# Patient Record
Sex: MALE | Race: ASIAN | NOT HISPANIC OR LATINO | URBAN - METROPOLITAN AREA
[De-identification: names, ages, dates, MRNs, and addresses within clinical notes are randomized per-mention and may not be internally consistent; named-entity substitution may affect disease eponyms.]

---

## 2017-04-07 ENCOUNTER — CHARTING TRANS (OUTPATIENT)
Dept: URGENT CARE | Age: 22
End: 2017-04-07

## 2017-06-29 ENCOUNTER — OFFICE VISIT (OUTPATIENT)
Dept: INTERNAL MEDICINE CLINIC | Facility: CLINIC | Age: 22
End: 2017-06-29

## 2017-06-29 ENCOUNTER — LAB ENCOUNTER (OUTPATIENT)
Dept: LAB | Age: 22
End: 2017-06-29
Attending: STUDENT IN AN ORGANIZED HEALTH CARE EDUCATION/TRAINING PROGRAM
Payer: COMMERCIAL

## 2017-06-29 VITALS
TEMPERATURE: 98 F | HEIGHT: 68 IN | SYSTOLIC BLOOD PRESSURE: 128 MMHG | OXYGEN SATURATION: 98 % | HEART RATE: 98 BPM | BODY MASS INDEX: 47.74 KG/M2 | DIASTOLIC BLOOD PRESSURE: 86 MMHG | WEIGHT: 315 LBS

## 2017-06-29 DIAGNOSIS — Z13.0 SCREENING FOR IRON DEFICIENCY ANEMIA: ICD-10-CM

## 2017-06-29 DIAGNOSIS — Z13.1 SCREENING FOR DIABETES MELLITUS: ICD-10-CM

## 2017-06-29 DIAGNOSIS — Z13.228 SCREENING FOR METABOLIC DISORDER: ICD-10-CM

## 2017-06-29 DIAGNOSIS — E55.9 VITAMIN D DEFICIENCY: ICD-10-CM

## 2017-06-29 DIAGNOSIS — Z13.89 SCREENING FOR GENITOURINARY CONDITION: ICD-10-CM

## 2017-06-29 DIAGNOSIS — Z13.220 SCREENING FOR LIPOID DISORDERS: ICD-10-CM

## 2017-06-29 DIAGNOSIS — R29.818 SUSPECTED SLEEP APNEA: ICD-10-CM

## 2017-06-29 DIAGNOSIS — Z13.29 SCREENING FOR THYROID DISORDER: ICD-10-CM

## 2017-06-29 DIAGNOSIS — Z00.00 ENCOUNTER FOR ANNUAL PHYSICAL EXAM: Primary | ICD-10-CM

## 2017-06-29 PROBLEM — E66.01 MORBID OBESITY WITH BMI OF 50.0-59.9, ADULT (HCC): Status: ACTIVE | Noted: 2017-06-29

## 2017-06-29 LAB
25-HYDROXYVITAMIN D (TOTAL): 18.3 NG/ML (ref 30–100)
ALBUMIN SERPL-MCNC: 3.6 G/DL (ref 3.5–4.8)
ALP LIVER SERPL-CCNC: 79 U/L (ref 45–117)
ALT SERPL-CCNC: 141 U/L (ref 17–63)
AST SERPL-CCNC: 61 U/L (ref 15–41)
BASOPHILS # BLD AUTO: 0.04 X10(3) UL (ref 0–0.1)
BASOPHILS NFR BLD AUTO: 0.4 %
BILIRUB SERPL-MCNC: 0.8 MG/DL (ref 0.1–2)
BILIRUB UR QL STRIP.AUTO: NEGATIVE
BUN BLD-MCNC: 12 MG/DL (ref 8–20)
CALCIUM BLD-MCNC: 9.2 MG/DL (ref 8.3–10.3)
CHLORIDE: 105 MMOL/L (ref 101–111)
CHOLEST SMN-MCNC: 164 MG/DL (ref ?–190)
CLARITY UR REFRACT.AUTO: CLEAR
CO2: 23 MMOL/L (ref 22–32)
COLOR UR AUTO: YELLOW
CREAT BLD-MCNC: 1.06 MG/DL (ref 0.7–1.3)
EOSINOPHIL # BLD AUTO: 0.25 X10(3) UL (ref 0–0.3)
EOSINOPHIL NFR BLD AUTO: 2.7 %
ERYTHROCYTE [DISTWIDTH] IN BLOOD BY AUTOMATED COUNT: 11.9 % (ref 11.5–16)
EST. AVERAGE GLUCOSE BLD GHB EST-MCNC: 123 MG/DL (ref 68–126)
GLUCOSE BLD-MCNC: 97 MG/DL (ref 70–99)
GLUCOSE UR STRIP.AUTO-MCNC: NEGATIVE MG/DL
HBA1C MFR BLD HPLC: 5.9 % (ref ?–5.7)
HCT VFR BLD AUTO: 44.7 % (ref 37–53)
HDLC SERPL-MCNC: 40 MG/DL (ref 45–?)
HDLC SERPL: 4.1 {RATIO} (ref ?–4.97)
HGB BLD-MCNC: 15.3 G/DL (ref 13–17)
IMMATURE GRANULOCYTE COUNT: 0.02 X10(3) UL (ref 0–1)
IMMATURE GRANULOCYTE RATIO %: 0.2 %
KETONES UR STRIP.AUTO-MCNC: NEGATIVE MG/DL
LDLC SERPL CALC-MCNC: 98 MG/DL (ref ?–120)
LEUKOCYTE ESTERASE UR QL STRIP.AUTO: NEGATIVE
LYMPHOCYTES # BLD AUTO: 2.28 X10(3) UL (ref 0.9–4)
LYMPHOCYTES NFR BLD AUTO: 24.5 %
M PROTEIN MFR SERPL ELPH: 8.2 G/DL (ref 6.1–8.3)
MCH RBC QN AUTO: 28.4 PG (ref 27–33.2)
MCHC RBC AUTO-ENTMCNC: 34.2 G/DL (ref 31–37)
MCV RBC AUTO: 83.1 FL (ref 80–99)
MONOCYTES # BLD AUTO: 0.63 X10(3) UL (ref 0.1–0.6)
MONOCYTES NFR BLD AUTO: 6.8 %
NEUTROPHIL ABS PRELIM: 6.1 X10 (3) UL (ref 1.3–6.7)
NEUTROPHILS # BLD AUTO: 6.1 X10(3) UL (ref 1.3–6.7)
NEUTROPHILS NFR BLD AUTO: 65.4 %
NITRITE UR QL STRIP.AUTO: NEGATIVE
NONHDLC SERPL-MCNC: 124 MG/DL (ref ?–150)
PH UR STRIP.AUTO: 6 [PH] (ref 4.5–8)
PLATELET # BLD AUTO: 238 10(3)UL (ref 150–450)
POTASSIUM SERPL-SCNC: 4 MMOL/L (ref 3.6–5.1)
PROT UR STRIP.AUTO-MCNC: NEGATIVE MG/DL
RBC # BLD AUTO: 5.38 X10(6)UL (ref 4.3–5.7)
RBC UR QL AUTO: NEGATIVE
RED CELL DISTRIBUTION WIDTH-SD: 35.7 FL (ref 35.1–46.3)
SODIUM SERPL-SCNC: 139 MMOL/L (ref 136–144)
SP GR UR STRIP.AUTO: 1.02 (ref 1–1.03)
TRIGLYCERIDES: 131 MG/DL (ref ?–115)
TSI SER-ACNC: 3.75 MIU/ML (ref 0.35–5.5)
UROBILINOGEN UR STRIP.AUTO-MCNC: <2 MG/DL
VLDL: 26 MG/DL (ref 5–40)
WBC # BLD AUTO: 9.3 X10(3) UL (ref 4–13)

## 2017-06-29 PROCEDURE — 80050 GENERAL HEALTH PANEL: CPT | Performed by: STUDENT IN AN ORGANIZED HEALTH CARE EDUCATION/TRAINING PROGRAM

## 2017-06-29 PROCEDURE — 80061 LIPID PANEL: CPT | Performed by: STUDENT IN AN ORGANIZED HEALTH CARE EDUCATION/TRAINING PROGRAM

## 2017-06-29 PROCEDURE — 99395 PREV VISIT EST AGE 18-39: CPT | Performed by: STUDENT IN AN ORGANIZED HEALTH CARE EDUCATION/TRAINING PROGRAM

## 2017-06-29 PROCEDURE — 81003 URINALYSIS AUTO W/O SCOPE: CPT | Performed by: STUDENT IN AN ORGANIZED HEALTH CARE EDUCATION/TRAINING PROGRAM

## 2017-06-29 PROCEDURE — 36415 COLL VENOUS BLD VENIPUNCTURE: CPT | Performed by: STUDENT IN AN ORGANIZED HEALTH CARE EDUCATION/TRAINING PROGRAM

## 2017-06-29 PROCEDURE — 82306 VITAMIN D 25 HYDROXY: CPT | Performed by: STUDENT IN AN ORGANIZED HEALTH CARE EDUCATION/TRAINING PROGRAM

## 2017-06-29 PROCEDURE — 83036 HEMOGLOBIN GLYCOSYLATED A1C: CPT | Performed by: STUDENT IN AN ORGANIZED HEALTH CARE EDUCATION/TRAINING PROGRAM

## 2017-06-29 NOTE — PROGRESS NOTES
[facility]    REASON FOR VISIT:    Joselin Hayes is a 25year old male who presents for an [address]. Current Complaints: Reports excessive daytime sleepiness and tiredness.  His father reports that patient snores a lot during his sl (mg/dL)   Date Value   07/30/2013 80         Gonorrhea Screening If high risk No results found for: GONOCOCCUS    HIV Screening For all adults age 22-65, older adults at increased risk No results found for: HIV    Syphilis Screening Screen if pregnant or h Does not bruise/bleed easily. Psychiatric/Behavioral: Negative for confusion and agitation. The patient is not nervous/anxious.       EXAM:   /86   Pulse 98   Temp 98.1 °F (36.7 °C) (Oral)   Ht 68\"   Wt (!) 335 lb   SpO2 98%   BMI 50.94 kg/m²     W for genitourinary condition  Vitamin D deficiency      Age appropriate cancer screening, labs, safety, immunizations were discussed with the patient and ordered as follows:      An was seen today for physical.    Diagnoses and all orders for this visit: to the plan.     Diet counseling perfomed  Exercise counseling perfomed    SUGGESTED VACCINATIONS - Influenza, Pneumococcal, Zoster, Tetanus     Immunization History   Administered Date(s) Administered   • TDAP 05/28/2015       Influenza Annually   Pneumoco

## 2017-06-29 NOTE — PATIENT INSTRUCTIONS
Prevention Guidelines, Men Ages 25 to 44  Screening tests and vaccines are an important part of managing your health. Health counseling is essential, too. Below are guidelines for these, for men ages 25 to 44.  Talk with your healthcare provider to make s Hepatitis B Men at increased risk for infection – talk with your healthcare provider 3 doses over 6 months; second dose should be given 1 month after the first dose; the third dose should be given at least 2 months after the second dose and at least 4 chandrakant Date Last Reviewed: 3/30/2015  © 8060-6176 [address], [address] [place], [place] [address]. All rights reserved. This information is not intended as a substitute for professional medical care.  Always follow your healthcare professional If you have breathing problems, Continuous Positive Airway Pressure (CPAP) may be used. CPAP is a device that can help you breathe by adding mild air pressure to your airway passages and improve your sleep.  It may be used during the second half of your julia

## 2017-07-03 ENCOUNTER — TELEPHONE (OUTPATIENT)
Dept: INTERNAL MEDICINE CLINIC | Facility: CLINIC | Age: 22
End: 2017-07-03

## 2017-07-03 DIAGNOSIS — R74.8 ELEVATED LIVER ENZYMES: ICD-10-CM

## 2017-07-03 DIAGNOSIS — E55.9 VITAMIN D DEFICIENCY: Primary | ICD-10-CM

## 2017-07-03 RX ORDER — ERGOCALCIFEROL 1.25 MG/1
50000 CAPSULE ORAL WEEKLY
Qty: 12 CAPSULE | Refills: 0 | Status: SHIPPED | OUTPATIENT
Start: 2017-07-03 | End: 2018-02-23

## 2017-07-03 NOTE — TELEPHONE ENCOUNTER
----- Message from Scar Fulton MD sent at 7/2/2017  8:02 PM CDT -----  Normal CBC, UA, TSH, total cholesterol.   HbA1C is in prediabetic rang Pe and continues to rise, low HDL - significant weight loss, low fat, low cab diet and regular exercise is stron

## 2017-07-03 NOTE — TELEPHONE ENCOUNTER
[facility]. Rx and order pending. Results and recommendations d/w pt he expressed understanding. Rx sent, orders placed.

## 2017-07-12 ENCOUNTER — HOSPITAL ENCOUNTER (OUTPATIENT)
Dept: ULTRASOUND IMAGING | Age: 22
Discharge: HOME | End: 2017-07-12
Attending: STUDENT IN AN ORGANIZED HEALTH CARE EDUCATION/TRAINING PROGRAM
Payer: COMMERCIAL

## 2017-07-12 DIAGNOSIS — R74.8 ELEVATED LIVER ENZYMES: ICD-10-CM

## 2017-07-12 PROCEDURE — 76700 US EXAM ABDOM COMPLETE: CPT | Performed by: STUDENT IN AN ORGANIZED HEALTH CARE EDUCATION/TRAINING PROGRAM

## 2017-08-31 ENCOUNTER — OFFICE VISIT (OUTPATIENT)
Dept: SLEEP CENTER | Facility: HOSPITAL | Age: 22
End: 2017-08-31
Attending: INTERNAL MEDICINE
Payer: COMMERCIAL

## 2017-08-31 PROCEDURE — 95806 SLEEP STUDY UNATT&RESP EFFT: CPT

## 2017-09-05 NOTE — PROCEDURES
[address]       Accredited by the [facility] of [facility] Medicine ([facility])    PATIENT'S NAME:        NAYLA CHARLTON  ATTENDING PHYSICIAN:   Farshad Dougherty M.D. REFERRING PHYSICIAN:   Farshad Dougherty M.D.   PATIENT within 8-10 weeks of initiating CPAP therapy.     Dictated By Jelly Ramirez M.D.  d: 09/05/2017 14:57:50  t: 09/05/2017 15:17:30  [facility] [id number]  YT/    cc: Angelo Kincaid M.D.

## 2017-12-16 ENCOUNTER — OFFICE VISIT (OUTPATIENT)
Dept: SLEEP CENTER | Facility: HOSPITAL | Age: 22
End: 2017-12-16
Attending: INTERNAL MEDICINE
Payer: COMMERCIAL

## 2017-12-16 PROCEDURE — 95811 POLYSOM 6/>YRS CPAP 4/> PARM: CPT

## 2017-12-21 NOTE — PROCEDURES
[address]       Accredited by the [facility] of [facility] Medicine ([facility]ASM)    PATIENT'S NAME:        NAYLA CHARLTON  ATTENDING PHYSICIAN:   Blaze Guillaume M.D. REFERRING PHYSICIAN:   Blaze Guillaume M.D.   PATIENT detected. The apnea-hypopnea index is 0.4. The oxygen andrew is 93. During the CPAP titration, the patient spent the entire record with a saturation above 90% with an oxygen andrew of 93%. PERIODIC LIMB MOVEMENTS:  PLM index is 0.     EEG:  With a limit

## 2018-02-23 PROBLEM — Z99.89 OSA ON CPAP: Chronic | Status: ACTIVE | Noted: 2018-02-23

## 2018-02-23 PROBLEM — G47.33 OSA ON CPAP: Chronic | Status: ACTIVE | Noted: 2018-02-23

## 2018-11-28 VITALS
HEART RATE: 88 BPM | TEMPERATURE: 98.1 F | RESPIRATION RATE: 16 BRPM | DIASTOLIC BLOOD PRESSURE: 88 MMHG | SYSTOLIC BLOOD PRESSURE: 130 MMHG

## 2019-09-09 ENCOUNTER — LAB ENCOUNTER (OUTPATIENT)
Dept: LAB | Age: 24
End: 2019-09-09
Attending: INTERNAL MEDICINE
Payer: COMMERCIAL

## 2019-09-09 ENCOUNTER — OFFICE VISIT (OUTPATIENT)
Dept: INTERNAL MEDICINE CLINIC | Facility: CLINIC | Age: 24
End: 2019-09-09
Payer: COMMERCIAL

## 2019-09-09 VITALS
WEIGHT: 315 LBS | BODY MASS INDEX: 47.74 KG/M2 | OXYGEN SATURATION: 98 % | RESPIRATION RATE: 16 BRPM | TEMPERATURE: 99 F | DIASTOLIC BLOOD PRESSURE: 76 MMHG | HEART RATE: 92 BPM | SYSTOLIC BLOOD PRESSURE: 138 MMHG | HEIGHT: 68 IN

## 2019-09-09 DIAGNOSIS — Z00.00 LABORATORY EXAMINATION ORDERED AS PART OF A ROUTINE GENERAL MEDICAL EXAMINATION: ICD-10-CM

## 2019-09-09 DIAGNOSIS — Z13.89 SCREENING FOR GENITOURINARY CONDITION: ICD-10-CM

## 2019-09-09 DIAGNOSIS — Z13.0 SCREENING, IRON DEFICIENCY ANEMIA: ICD-10-CM

## 2019-09-09 DIAGNOSIS — Z13.29 THYROID DISORDER SCREEN: ICD-10-CM

## 2019-09-09 DIAGNOSIS — Z13.220 LIPID SCREENING: ICD-10-CM

## 2019-09-09 DIAGNOSIS — Z00.00 ANNUAL PHYSICAL EXAM: Primary | ICD-10-CM

## 2019-09-09 PROBLEM — E55.9 VITAMIN D DEFICIENCY: Status: RESOLVED | Noted: 2017-07-03 | Resolved: 2019-09-09

## 2019-09-09 LAB
ALBUMIN SERPL-MCNC: 3.8 G/DL (ref 3.4–5)
ALBUMIN/GLOB SERPL: 0.9 {RATIO} (ref 1–2)
ALP LIVER SERPL-CCNC: 81 U/L (ref 45–117)
ALT SERPL-CCNC: 104 U/L (ref 16–61)
ANION GAP SERPL CALC-SCNC: 7 MMOL/L (ref 0–18)
AST SERPL-CCNC: 46 U/L (ref 15–37)
BASOPHILS # BLD AUTO: 0.02 X10(3) UL (ref 0–0.2)
BASOPHILS NFR BLD AUTO: 0.2 %
BILIRUB SERPL-MCNC: 0.8 MG/DL (ref 0.1–2)
BILIRUB UR QL STRIP.AUTO: NEGATIVE
BUN BLD-MCNC: 13 MG/DL (ref 7–18)
BUN/CREAT SERPL: 12.9 (ref 10–20)
CALCIUM BLD-MCNC: 9.3 MG/DL (ref 8.5–10.1)
CHLORIDE SERPL-SCNC: 104 MMOL/L (ref 98–112)
CHOLEST SMN-MCNC: 168 MG/DL (ref ?–200)
CLARITY UR REFRACT.AUTO: CLEAR
CO2 SERPL-SCNC: 28 MMOL/L (ref 21–32)
CREAT BLD-MCNC: 1.01 MG/DL (ref 0.7–1.3)
DEPRECATED RDW RBC AUTO: 36.8 FL (ref 35.1–46.3)
EOSINOPHIL # BLD AUTO: 0.21 X10(3) UL (ref 0–0.7)
EOSINOPHIL NFR BLD AUTO: 2.5 %
ERYTHROCYTE [DISTWIDTH] IN BLOOD BY AUTOMATED COUNT: 11.6 % (ref 11–15)
EST. AVERAGE GLUCOSE BLD GHB EST-MCNC: 123 MG/DL (ref 68–126)
GLOBULIN PLAS-MCNC: 4.3 G/DL (ref 2.8–4.4)
GLUCOSE BLD-MCNC: 91 MG/DL (ref 70–99)
GLUCOSE UR STRIP.AUTO-MCNC: NEGATIVE MG/DL
HBA1C MFR BLD HPLC: 5.9 % (ref ?–5.7)
HCT VFR BLD AUTO: 44.7 % (ref 39–53)
HDLC SERPL-MCNC: 38 MG/DL (ref 40–59)
HGB BLD-MCNC: 14.6 G/DL (ref 13–17.5)
IMM GRANULOCYTES # BLD AUTO: 0.02 X10(3) UL (ref 0–1)
IMM GRANULOCYTES NFR BLD: 0.2 %
KETONES UR STRIP.AUTO-MCNC: NEGATIVE MG/DL
LDLC SERPL CALC-MCNC: 109 MG/DL (ref ?–100)
LEUKOCYTE ESTERASE UR QL STRIP.AUTO: NEGATIVE
LYMPHOCYTES # BLD AUTO: 2.21 X10(3) UL (ref 1–4)
LYMPHOCYTES NFR BLD AUTO: 26.3 %
M PROTEIN MFR SERPL ELPH: 8.1 G/DL (ref 6.4–8.2)
MCH RBC QN AUTO: 28.3 PG (ref 26–34)
MCHC RBC AUTO-ENTMCNC: 32.7 G/DL (ref 31–37)
MCV RBC AUTO: 86.8 FL (ref 80–100)
MONOCYTES # BLD AUTO: 0.57 X10(3) UL (ref 0.1–1)
MONOCYTES NFR BLD AUTO: 6.8 %
NEUTROPHILS # BLD AUTO: 5.38 X10 (3) UL (ref 1.5–7.7)
NEUTROPHILS # BLD AUTO: 5.38 X10(3) UL (ref 1.5–7.7)
NEUTROPHILS NFR BLD AUTO: 64 %
NITRITE UR QL STRIP.AUTO: NEGATIVE
NONHDLC SERPL-MCNC: 130 MG/DL (ref ?–130)
OSMOLALITY SERPL CALC.SUM OF ELEC: 288 MOSM/KG (ref 275–295)
PH UR STRIP.AUTO: 6 [PH] (ref 4.5–8)
PLATELET # BLD AUTO: 244 10(3)UL (ref 150–450)
POTASSIUM SERPL-SCNC: 4 MMOL/L (ref 3.5–5.1)
PROT UR STRIP.AUTO-MCNC: NEGATIVE MG/DL
RBC # BLD AUTO: 5.15 X10(6)UL (ref 4.3–5.7)
RBC UR QL AUTO: NEGATIVE
SODIUM SERPL-SCNC: 139 MMOL/L (ref 136–145)
SP GR UR STRIP.AUTO: 1.01 (ref 1–1.03)
TRIGL SERPL-MCNC: 107 MG/DL (ref 30–149)
TSI SER-ACNC: 3.72 MIU/ML (ref 0.36–3.74)
UROBILINOGEN UR STRIP.AUTO-MCNC: <2 MG/DL
VLDLC SERPL CALC-MCNC: 21 MG/DL (ref 0–30)
WBC # BLD AUTO: 8.4 X10(3) UL (ref 4–11)

## 2019-09-09 PROCEDURE — 99395 PREV VISIT EST AGE 18-39: CPT | Performed by: INTERNAL MEDICINE

## 2019-09-09 PROCEDURE — 36415 COLL VENOUS BLD VENIPUNCTURE: CPT | Performed by: INTERNAL MEDICINE

## 2019-09-09 PROCEDURE — 83036 HEMOGLOBIN GLYCOSYLATED A1C: CPT | Performed by: INTERNAL MEDICINE

## 2019-09-09 PROCEDURE — 80050 GENERAL HEALTH PANEL: CPT | Performed by: INTERNAL MEDICINE

## 2019-09-09 PROCEDURE — 81003 URINALYSIS AUTO W/O SCOPE: CPT | Performed by: INTERNAL MEDICINE

## 2019-09-09 PROCEDURE — 80061 LIPID PANEL: CPT | Performed by: INTERNAL MEDICINE

## 2019-09-09 NOTE — PROGRESS NOTES
HPI:    Patient ID: An Q Almeida Patient is a 25year old male. HPI  An Q Almeida Patient is a 25year old male who presents for a complete physical exam.   HPI:   Pt complains of nothing. Using cpap    PAST MEDICAL, SOCIAL, FAMILY HISTORIES REVIEWED WITH PT  .       Brenda Powers Smokeless tobacco: Never Used    Alcohol use: No    Drug use: No     Occ: yes. : no. Children: no.   Exercise:  twice per week, three times per week.   Diet: watches fats closely and watches sugar closely     REVIEW OF SYSTEMS:   GENERAL: feels well CPX in 12 m. Review of Systems         Current Outpatient Medications:  selenium sulfide 2.5 % External Lotion Apply to scalp daily Disp: 1 Bottle Rfl: 1   Fexofenadine HCl (ALLEGRA ALLERGY) 180 MG Oral Tab Take 180 mg by mouth daily.  Disp:  Rfl:      A

## 2020-08-13 ENCOUNTER — LAB ENCOUNTER (OUTPATIENT)
Dept: LAB | Age: 25
End: 2020-08-13
Attending: INTERNAL MEDICINE
Payer: COMMERCIAL

## 2020-08-13 ENCOUNTER — OFFICE VISIT (OUTPATIENT)
Dept: INTERNAL MEDICINE CLINIC | Facility: CLINIC | Age: 25
End: 2020-08-13
Payer: COMMERCIAL

## 2020-08-13 VITALS
RESPIRATION RATE: 16 BRPM | OXYGEN SATURATION: 98 % | HEIGHT: 68 IN | BODY MASS INDEX: 47.74 KG/M2 | WEIGHT: 315 LBS | TEMPERATURE: 99 F | SYSTOLIC BLOOD PRESSURE: 132 MMHG | HEART RATE: 78 BPM | DIASTOLIC BLOOD PRESSURE: 84 MMHG

## 2020-08-13 DIAGNOSIS — Z13.0 SCREENING, IRON DEFICIENCY ANEMIA: ICD-10-CM

## 2020-08-13 DIAGNOSIS — Z13.220 LIPID SCREENING: ICD-10-CM

## 2020-08-13 DIAGNOSIS — Z00.00 LABORATORY EXAMINATION ORDERED AS PART OF A ROUTINE GENERAL MEDICAL EXAMINATION: ICD-10-CM

## 2020-08-13 DIAGNOSIS — Z13.29 THYROID DISORDER SCREEN: ICD-10-CM

## 2020-08-13 DIAGNOSIS — Z00.00 ANNUAL PHYSICAL EXAM: Primary | ICD-10-CM

## 2020-08-13 DIAGNOSIS — Z13.89 SCREENING FOR GENITOURINARY CONDITION: ICD-10-CM

## 2020-08-13 LAB
ALBUMIN SERPL-MCNC: 3.9 G/DL (ref 3.4–5)
ALBUMIN/GLOB SERPL: 1 {RATIO} (ref 1–2)
ALP LIVER SERPL-CCNC: 74 U/L (ref 45–117)
ALT SERPL-CCNC: 78 U/L (ref 16–61)
ANION GAP SERPL CALC-SCNC: 6 MMOL/L (ref 0–18)
AST SERPL-CCNC: 34 U/L (ref 15–37)
BASOPHILS # BLD AUTO: 0.05 X10(3) UL (ref 0–0.2)
BASOPHILS NFR BLD AUTO: 0.5 %
BILIRUB SERPL-MCNC: 0.6 MG/DL (ref 0.1–2)
BILIRUB UR QL STRIP.AUTO: NEGATIVE
BUN BLD-MCNC: 13 MG/DL (ref 7–18)
BUN/CREAT SERPL: 14.6 (ref 10–20)
CALCIUM BLD-MCNC: 9.7 MG/DL (ref 8.5–10.1)
CHLORIDE SERPL-SCNC: 105 MMOL/L (ref 98–112)
CHOLEST SMN-MCNC: 178 MG/DL (ref ?–200)
CLARITY UR REFRACT.AUTO: CLEAR
CO2 SERPL-SCNC: 27 MMOL/L (ref 21–32)
CREAT BLD-MCNC: 0.89 MG/DL (ref 0.7–1.3)
DEPRECATED RDW RBC AUTO: 36.8 FL (ref 35.1–46.3)
EOSINOPHIL # BLD AUTO: 0.23 X10(3) UL (ref 0–0.7)
EOSINOPHIL NFR BLD AUTO: 2.5 %
ERYTHROCYTE [DISTWIDTH] IN BLOOD BY AUTOMATED COUNT: 11.7 % (ref 11–15)
EST. AVERAGE GLUCOSE BLD GHB EST-MCNC: 111 MG/DL (ref 68–126)
GLOBULIN PLAS-MCNC: 4.1 G/DL (ref 2.8–4.4)
GLUCOSE BLD-MCNC: 82 MG/DL (ref 70–99)
GLUCOSE UR STRIP.AUTO-MCNC: NEGATIVE MG/DL
HBA1C MFR BLD HPLC: 5.5 % (ref ?–5.7)
HCT VFR BLD AUTO: 43.6 % (ref 39–53)
HDLC SERPL-MCNC: 41 MG/DL (ref 40–59)
HGB BLD-MCNC: 14.4 G/DL (ref 13–17.5)
IMM GRANULOCYTES # BLD AUTO: 0.03 X10(3) UL (ref 0–1)
IMM GRANULOCYTES NFR BLD: 0.3 %
KETONES UR STRIP.AUTO-MCNC: NEGATIVE MG/DL
LDLC SERPL CALC-MCNC: 118 MG/DL (ref ?–100)
LEUKOCYTE ESTERASE UR QL STRIP.AUTO: NEGATIVE
LYMPHOCYTES # BLD AUTO: 2.85 X10(3) UL (ref 1–4)
LYMPHOCYTES NFR BLD AUTO: 31 %
M PROTEIN MFR SERPL ELPH: 8 G/DL (ref 6.4–8.2)
MCH RBC QN AUTO: 28.5 PG (ref 26–34)
MCHC RBC AUTO-ENTMCNC: 33 G/DL (ref 31–37)
MCV RBC AUTO: 86.2 FL (ref 80–100)
MONOCYTES # BLD AUTO: 0.61 X10(3) UL (ref 0.1–1)
MONOCYTES NFR BLD AUTO: 6.6 %
NEUTROPHILS # BLD AUTO: 5.41 X10 (3) UL (ref 1.5–7.7)
NEUTROPHILS # BLD AUTO: 5.41 X10(3) UL (ref 1.5–7.7)
NEUTROPHILS NFR BLD AUTO: 59.1 %
NITRITE UR QL STRIP.AUTO: NEGATIVE
NONHDLC SERPL-MCNC: 137 MG/DL (ref ?–130)
OSMOLALITY SERPL CALC.SUM OF ELEC: 285 MOSM/KG (ref 275–295)
PATIENT FASTING Y/N/NP: YES
PATIENT FASTING Y/N/NP: YES
PH UR STRIP.AUTO: 6 [PH] (ref 4.5–8)
PLATELET # BLD AUTO: 234 10(3)UL (ref 150–450)
POTASSIUM SERPL-SCNC: 3.8 MMOL/L (ref 3.5–5.1)
PROT UR STRIP.AUTO-MCNC: NEGATIVE MG/DL
RBC # BLD AUTO: 5.06 X10(6)UL (ref 4.3–5.7)
RBC UR QL AUTO: NEGATIVE
SODIUM SERPL-SCNC: 138 MMOL/L (ref 136–145)
SP GR UR STRIP.AUTO: 1.01 (ref 1–1.03)
TRIGL SERPL-MCNC: 96 MG/DL (ref 30–149)
TSI SER-ACNC: 2.67 MIU/ML (ref 0.36–3.74)
UROBILINOGEN UR STRIP.AUTO-MCNC: <2 MG/DL
VLDLC SERPL CALC-MCNC: 19 MG/DL (ref 0–30)
WBC # BLD AUTO: 9.2 X10(3) UL (ref 4–11)

## 2020-08-13 PROCEDURE — 36415 COLL VENOUS BLD VENIPUNCTURE: CPT | Performed by: INTERNAL MEDICINE

## 2020-08-13 PROCEDURE — 3008F BODY MASS INDEX DOCD: CPT | Performed by: INTERNAL MEDICINE

## 2020-08-13 PROCEDURE — 81003 URINALYSIS AUTO W/O SCOPE: CPT | Performed by: INTERNAL MEDICINE

## 2020-08-13 PROCEDURE — 80061 LIPID PANEL: CPT | Performed by: INTERNAL MEDICINE

## 2020-08-13 PROCEDURE — 83036 HEMOGLOBIN GLYCOSYLATED A1C: CPT | Performed by: INTERNAL MEDICINE

## 2020-08-13 PROCEDURE — 3075F SYST BP GE 130 - 139MM HG: CPT | Performed by: INTERNAL MEDICINE

## 2020-08-13 PROCEDURE — 3079F DIAST BP 80-89 MM HG: CPT | Performed by: INTERNAL MEDICINE

## 2020-08-13 PROCEDURE — 99395 PREV VISIT EST AGE 18-39: CPT | Performed by: INTERNAL MEDICINE

## 2020-08-13 PROCEDURE — 80050 GENERAL HEALTH PANEL: CPT | Performed by: INTERNAL MEDICINE

## 2020-08-13 NOTE — PROGRESS NOTES
HPI:    Patient ID: Joselin Obregon is a 22year old male. HPI  Joselin Obregon is a 22year old male who presents for a complete physical exam.   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  First time living away from surgical history on file.    Family History   Problem Relation Age of Onset   • Diabetes Maternal Grandmother       Social History:  Social History    Tobacco Use      Smoking status: Never Smoker      Smokeless tobacco: Never Used    Alcohol use: No    Alan self exam   The patient indicates understanding of these issues and agrees to the plan. The patient is asked to return for CPX in 12 m.     Review of Systems         Current Outpatient Medications   Medication Sig Dispense Refill   • selenium sulfide 2.5 %

## 2020-08-13 NOTE — PATIENT INSTRUCTIONS
Prevention Guidelines, Men Ages 25 to 44  Screening tests and vaccines are an important part of managing your health. A screening test is done to find possible disorders or diseases in people who don't have any symptoms.  The goal is to find a disease ear Chickenpox (varicella) All men in this age group who have no record of this infection or vaccine 2 doses; the second dose should be given at least 4 weeks after the first dose   Hepatitis A Men at increased risk for infection – talk with your healthcare pr Sexually transmitted infection prevention Men who are sexually active At routine exams   Skin cancer All men in this age group. At routine exams. You may be reminded to avoid intentional tanning and tanning beds.     1Those who are 25years of age, who are

## 2021-08-10 ENCOUNTER — LAB ENCOUNTER (OUTPATIENT)
Dept: LAB | Age: 26
End: 2021-08-10
Attending: INTERNAL MEDICINE
Payer: COMMERCIAL

## 2021-08-10 ENCOUNTER — OFFICE VISIT (OUTPATIENT)
Dept: INTERNAL MEDICINE CLINIC | Facility: CLINIC | Age: 26
End: 2021-08-10
Payer: COMMERCIAL

## 2021-08-10 VITALS
TEMPERATURE: 99 F | BODY MASS INDEX: 44.1 KG/M2 | WEIGHT: 315 LBS | RESPIRATION RATE: 16 BRPM | DIASTOLIC BLOOD PRESSURE: 82 MMHG | SYSTOLIC BLOOD PRESSURE: 134 MMHG | OXYGEN SATURATION: 97 % | HEART RATE: 81 BPM | HEIGHT: 71 IN

## 2021-08-10 DIAGNOSIS — Z13.29 THYROID DISORDER SCREEN: ICD-10-CM

## 2021-08-10 DIAGNOSIS — Z00.00 LABORATORY EXAMINATION ORDERED AS PART OF A ROUTINE GENERAL MEDICAL EXAMINATION: ICD-10-CM

## 2021-08-10 DIAGNOSIS — Z00.00 ANNUAL PHYSICAL EXAM: Primary | ICD-10-CM

## 2021-08-10 DIAGNOSIS — Z13.89 SCREENING FOR GENITOURINARY CONDITION: ICD-10-CM

## 2021-08-10 DIAGNOSIS — Z13.0 SCREENING, IRON DEFICIENCY ANEMIA: ICD-10-CM

## 2021-08-10 DIAGNOSIS — Z13.220 LIPID SCREENING: ICD-10-CM

## 2021-08-10 LAB
ALBUMIN SERPL-MCNC: 3.8 G/DL (ref 3.4–5)
ALBUMIN/GLOB SERPL: 0.9 {RATIO} (ref 1–2)
ALP LIVER SERPL-CCNC: 73 U/L
ALT SERPL-CCNC: 91 U/L
ANION GAP SERPL CALC-SCNC: 6 MMOL/L (ref 0–18)
AST SERPL-CCNC: 36 U/L (ref 15–37)
BASOPHILS # BLD AUTO: 0.04 X10(3) UL (ref 0–0.2)
BASOPHILS NFR BLD AUTO: 0.5 %
BILIRUB SERPL-MCNC: 0.6 MG/DL (ref 0.1–2)
BILIRUB UR QL STRIP.AUTO: NEGATIVE
BUN BLD-MCNC: 10 MG/DL (ref 7–18)
CALCIUM BLD-MCNC: 9.2 MG/DL (ref 8.5–10.1)
CHLORIDE SERPL-SCNC: 109 MMOL/L (ref 98–112)
CHOLEST SMN-MCNC: 171 MG/DL (ref ?–200)
CLARITY UR REFRACT.AUTO: CLEAR
CO2 SERPL-SCNC: 24 MMOL/L (ref 21–32)
COLOR UR AUTO: COLORLESS
CREAT BLD-MCNC: 0.98 MG/DL
EOSINOPHIL # BLD AUTO: 0.17 X10(3) UL (ref 0–0.7)
EOSINOPHIL NFR BLD AUTO: 2 %
ERYTHROCYTE [DISTWIDTH] IN BLOOD BY AUTOMATED COUNT: 11.9 %
EST. AVERAGE GLUCOSE BLD GHB EST-MCNC: 105 MG/DL (ref 68–126)
GLOBULIN PLAS-MCNC: 4.1 G/DL (ref 2.8–4.4)
GLUCOSE BLD-MCNC: 94 MG/DL (ref 70–99)
GLUCOSE UR STRIP.AUTO-MCNC: NEGATIVE MG/DL
HBA1C MFR BLD HPLC: 5.3 % (ref ?–5.7)
HCT VFR BLD AUTO: 45.6 %
HDLC SERPL-MCNC: 43 MG/DL (ref 40–59)
HGB BLD-MCNC: 15 G/DL
IMM GRANULOCYTES # BLD AUTO: 0.02 X10(3) UL (ref 0–1)
IMM GRANULOCYTES NFR BLD: 0.2 %
KETONES UR STRIP.AUTO-MCNC: NEGATIVE MG/DL
LDLC SERPL CALC-MCNC: 106 MG/DL (ref ?–100)
LEUKOCYTE ESTERASE UR QL STRIP.AUTO: NEGATIVE
LYMPHOCYTES # BLD AUTO: 2.34 X10(3) UL (ref 1–4)
LYMPHOCYTES NFR BLD AUTO: 27.7 %
M PROTEIN MFR SERPL ELPH: 7.9 G/DL (ref 6.4–8.2)
MCH RBC QN AUTO: 28.4 PG (ref 26–34)
MCHC RBC AUTO-ENTMCNC: 32.9 G/DL (ref 31–37)
MCV RBC AUTO: 86.4 FL
MONOCYTES # BLD AUTO: 0.6 X10(3) UL (ref 0.1–1)
MONOCYTES NFR BLD AUTO: 7.1 %
NEUTROPHILS # BLD AUTO: 5.29 X10 (3) UL (ref 1.5–7.7)
NEUTROPHILS # BLD AUTO: 5.29 X10(3) UL (ref 1.5–7.7)
NEUTROPHILS NFR BLD AUTO: 62.5 %
NITRITE UR QL STRIP.AUTO: NEGATIVE
NONHDLC SERPL-MCNC: 128 MG/DL (ref ?–130)
OSMOLALITY SERPL CALC.SUM OF ELEC: 287 MOSM/KG (ref 275–295)
PATIENT FASTING Y/N/NP: YES
PATIENT FASTING Y/N/NP: YES
PH UR STRIP.AUTO: 7 [PH] (ref 5–8)
PLATELET # BLD AUTO: 246 10(3)UL (ref 150–450)
POTASSIUM SERPL-SCNC: 4.1 MMOL/L (ref 3.5–5.1)
PROT UR STRIP.AUTO-MCNC: NEGATIVE MG/DL
RBC # BLD AUTO: 5.28 X10(6)UL
RBC UR QL AUTO: NEGATIVE
SODIUM SERPL-SCNC: 139 MMOL/L (ref 136–145)
SP GR UR STRIP.AUTO: 1 (ref 1–1.03)
TRIGL SERPL-MCNC: 123 MG/DL (ref 30–149)
TSI SER-ACNC: 2.36 MIU/ML (ref 0.36–3.74)
UROBILINOGEN UR STRIP.AUTO-MCNC: <2 MG/DL
VLDLC SERPL CALC-MCNC: 21 MG/DL (ref 0–30)
WBC # BLD AUTO: 8.5 X10(3) UL (ref 4–11)

## 2021-08-10 PROCEDURE — 3008F BODY MASS INDEX DOCD: CPT | Performed by: INTERNAL MEDICINE

## 2021-08-10 PROCEDURE — 99395 PREV VISIT EST AGE 18-39: CPT | Performed by: INTERNAL MEDICINE

## 2021-08-10 PROCEDURE — 80061 LIPID PANEL: CPT

## 2021-08-10 PROCEDURE — 3075F SYST BP GE 130 - 139MM HG: CPT | Performed by: INTERNAL MEDICINE

## 2021-08-10 PROCEDURE — 83036 HEMOGLOBIN GLYCOSYLATED A1C: CPT

## 2021-08-10 PROCEDURE — 84443 ASSAY THYROID STIM HORMONE: CPT

## 2021-08-10 PROCEDURE — 3079F DIAST BP 80-89 MM HG: CPT | Performed by: INTERNAL MEDICINE

## 2021-08-10 PROCEDURE — 81003 URINALYSIS AUTO W/O SCOPE: CPT

## 2021-08-10 PROCEDURE — 36415 COLL VENOUS BLD VENIPUNCTURE: CPT

## 2021-08-10 PROCEDURE — 85025 COMPLETE CBC W/AUTO DIFF WBC: CPT

## 2021-08-10 PROCEDURE — 80053 COMPREHEN METABOLIC PANEL: CPT

## 2021-08-10 RX ORDER — ACETAMINOPHEN 325 MG/1
325 TABLET ORAL EVERY 6 HOURS PRN
COMMUNITY

## 2021-08-10 NOTE — PROGRESS NOTES
HPI/Subjective:   Patient ID: Joselin Workman is a 32year old male.     HPI  Joselin ALEJANDRO Govind Villalba is a 32year old male who presents for a complete physical exam.   HPI:   Pt complains of nothing  Normal state of health  Trying to stay consistent with lifestyle changes mouth daily.         Past Medical History:   Diagnosis Date   • Closed fracture of one or more phalanges of foot 5/17/2007    R great toe, Salter-Asher II fx   • Obstructive apnea 08/31/2017    Edward HST AHI 60 SaO2 andrew 74%   • Rash       History review PLAN:   Joselin Barraza is a 32year old male who presents for a complete physical exam.      Body mass index is 49.79 kg/m². , recommended low fat diet and aerobic exercise 30 minutes three times weekly.  Health maintenance, will check fasting Lipids, CMP, CBC

## 2021-08-10 NOTE — PATIENT INSTRUCTIONS
Prevention Guidelines, Men Ages [address] [facility] 9 to 44  Screening tests and vaccines are an important part of managing your health. A screening test is done to find possible disorders or diseases in people who don't have any symptoms.  The goal is to find a disease ear vaccine 2 doses; the second dose should be given at least 4 weeks after the first dose   Hepatitis A Men at increased risk for infection – talk with your healthcare provider 2 doses given at least 6 months apart   Hepatitis B Men at increased risk for infe be reminded to avoid intentional tanning and tanning beds. 1Those who are 25years of age, who are not up-to-date on their childhood immunizations, should get all appropriate catch-up vaccines recommended by the CDC.    Charis last reviewed this educat

## (undated) NOTE — MR AVS SNAPSHOT
After Visit Summary   8/31/2017    An Ihsan Downs    MRN: [id number]           Visit Information     Date & Time  8/31/2017 11:00 AM Provider  SCHEDULE BY [facility] Department  [address] [place] [address] Dept.  Phone  [phone]      Allergies as of 8/31/2 few hours.            Treatment for mild   illness or injury that does   not require immediate attention   VIDEO VISITS  Average cost  $35*    e-VISITS  Average cost  $35*      [address]